# Patient Record
Sex: MALE | Race: WHITE
[De-identification: names, ages, dates, MRNs, and addresses within clinical notes are randomized per-mention and may not be internally consistent; named-entity substitution may affect disease eponyms.]

---

## 2019-02-20 ENCOUNTER — HOSPITAL ENCOUNTER (OUTPATIENT)
Dept: HOSPITAL 95 - ORSCSDS | Age: 64
Discharge: HOME | End: 2019-02-20
Attending: ORTHOPAEDIC SURGERY
Payer: COMMERCIAL

## 2019-02-20 VITALS — BODY MASS INDEX: 30.96 KG/M2 | WEIGHT: 233.6 LBS | HEIGHT: 72.99 IN

## 2019-02-20 DIAGNOSIS — Z79.899: ICD-10-CM

## 2019-02-20 DIAGNOSIS — Z91.030: ICD-10-CM

## 2019-02-20 DIAGNOSIS — Z88.2: ICD-10-CM

## 2019-02-20 DIAGNOSIS — M75.42: ICD-10-CM

## 2019-02-20 DIAGNOSIS — M75.122: Primary | ICD-10-CM

## 2019-02-20 DIAGNOSIS — Z87.891: ICD-10-CM

## 2019-02-20 DIAGNOSIS — G47.33: ICD-10-CM

## 2019-02-20 PROCEDURE — 0RNK4ZZ RELEASE LEFT SHOULDER JOINT, PERCUTANEOUS ENDOSCOPIC APPROACH: ICD-10-PCS | Performed by: ORTHOPAEDIC SURGERY

## 2019-02-20 PROCEDURE — C1713 ANCHOR/SCREW BN/BN,TIS/BN: HCPCS

## 2019-02-20 PROCEDURE — 0LM24ZZ REATTACHMENT OF LEFT SHOULDER TENDON, PERCUTANEOUS ENDOSCOPIC APPROACH: ICD-10-PCS | Performed by: ORTHOPAEDIC SURGERY

## 2019-02-20 NOTE — NUR
02/20/19 1026 Reymundo Marina S
PATIENT SITTING IN RECLINER CHAIR VISITING WITH WIFE AND DAUGHTER.
DENIES PAIN, N/V AT THIS TIME.VSS